# Patient Record
Sex: FEMALE | Race: AMERICAN INDIAN OR ALASKA NATIVE | NOT HISPANIC OR LATINO | Employment: OTHER | URBAN - METROPOLITAN AREA
[De-identification: names, ages, dates, MRNs, and addresses within clinical notes are randomized per-mention and may not be internally consistent; named-entity substitution may affect disease eponyms.]

---

## 2019-01-11 ENCOUNTER — HISTORICAL (OUTPATIENT)
Dept: RADIOLOGY | Facility: HOSPITAL | Age: 54
End: 2019-01-11

## 2019-01-25 ENCOUNTER — HISTORICAL (OUTPATIENT)
Dept: ADMINISTRATIVE | Facility: HOSPITAL | Age: 54
End: 2019-01-25

## 2022-04-05 ENCOUNTER — HISTORICAL (OUTPATIENT)
Dept: RADIOLOGY | Facility: HOSPITAL | Age: 57
End: 2022-04-05

## 2022-04-05 ENCOUNTER — HISTORICAL (OUTPATIENT)
Dept: ADMINISTRATIVE | Facility: HOSPITAL | Age: 57
End: 2022-04-05

## 2022-04-09 ENCOUNTER — HISTORICAL (OUTPATIENT)
Dept: ADMINISTRATIVE | Facility: HOSPITAL | Age: 57
End: 2022-04-09
Payer: MEDICAID

## 2022-04-27 VITALS
WEIGHT: 162.94 LBS | DIASTOLIC BLOOD PRESSURE: 78 MMHG | HEIGHT: 65 IN | BODY MASS INDEX: 27.15 KG/M2 | SYSTOLIC BLOOD PRESSURE: 113 MMHG

## 2022-04-30 NOTE — H&P
Patient:   Adrianna Velazquez            MRN: [id number]            FIN: [id number]               Age:   53 years     Sex:  Female     :  1965   Associated Diagnoses:   None   Author:   Shelley MEREDITH, Liza COLE      History of Present Illness   Ms. Velazquez is a 53 year old AAF with history of BRCA here for an EUS for duodenal submucosal mass.  She had imaging performed for BRCA surveillance that showed a 1.4 x 3.3 solid mass in the duodenum previously seen in 2016 but with interval enlargement.  She had an EGD with Dr. Knapp that described gastritis, gastric ulcers, 2.5 cm mass in the duodenum with nondiagnostic mucosal biopsies.  she did have a PET CT that did not show significant uptake in this mass.  She was seen by Dr. Ca who then referred her to me.   She reports problems with constipation.  Usually has a BM every 3-4 days.  Has tried fiber with limited success.  No bleeding or melena.  No significant abdominal pain but does have some bloating.  no weight loss.  No nausea, vomiting, early satiety, or reflux.  No anticoagulants.        Review of Systems   All other systems are negative      Health Status   Allergies:    Allergies (1) Active Reaction  Lortab UNKNOWN   Current medications:  (Selected)   Inpatient Medications  Ordered  Buffered Lidocaine 1% - 1mL syringe: 0.5 mL, 5 mg =, form: Injection, ID, As Directed PRN for other (see comment), first dose 19 8:40:00 CST, May inject 0.5mL at IV site, if not allergic  Plasmalyte 1,000 mL: 1,000 mL, 1,000 mL, IV, 20 mL/hr, start date 19 8:40:00 CST  midazolam: 1 mg, form: Injection, IV Push, q5min PRN for anxiety, Order duration: 2 dose(s), first dose 19 8:40:00 CST, stop date Limited # of times, may repeat x1 in 5 minutes if anxiety not relieved.  Hold  if pregnancy test is pending.  (ADULT PATIENTS)...  Documented Medications  Documented  HYDROCODONE-ACETAMIN 7.5-325: 1 tab(s), Oral, q4hr  NAPROXEN 500 MG TABLET: 500 mg = 1  tab(s), Oral, BID  PANTOPRAZOLE 40MG TABLETS: 40 mg = 1 tab(s), Oral, Daily  RANITIDINE 300 MG TABLET:   ZOLPIDEM 10MG TABLETS: 10 mg = 1 tab(s), Oral, qPM      Histories   Procedure history:    Endoscopic Ultrasonography (Upper) on 1/25/2019 at 53 Years.  Comments:  1/25/2019 10:Renea Vincent RN  auto-populated from documented surgical case  US FNA First Lesion (Upper) on 1/25/2019 at 53 Years.  Comments:  1/25/2019 10:Renea Vincent RN  auto-populated from documented surgical case  Esophagogastroduodenoscopy (Upper) on 1/25/2019 at 53 Years.  Comments:  1/25/2019 10:Renea Vincent RN  auto-populated from documented surgical case  Mastectomy Right.  Hernia Repair.   Social History        Social & Psychosocial Habits    Alcohol  01/24/2019  Use: Current    Tobacco  08/16/2018  Use: Former smoker    Type: Cigarettes    01/24/2019  Use: Former smoker, quit more    Patient Wants Consult For Cessation Counseling N/A    01/25/2019  Use: Former smoker, quit more    Patient Wants Consult For Cessation Counseling N/A.        Physical Examination      Vital Signs (last 24 hrs)_____  Last Charted___________  Temp Oral     36.6 DegC  (JAN 25 07:38)  Heart Rate Peripheral   89 bpm  (JAN 25 07:38)  Resp Rate         18 br/min  (JAN 25 07:38)  SBP      124 mmHg  (JAN 25 07:38)  DBP      84 mmHg  (JAN 25 07:38)  SpO2      99 %  (JAN 25 07:38)   General:  Alert and oriented, No acute distress.         Appearance: Well nourished.    Eye:  Normal conjunctiva.    Respiratory:  Lungs are clear to auscultation, Respirations are non-labored, Breath sounds are equal.    Cardiovascular:  Normal rate, Regular rhythm, No murmur, No edema.    Gastrointestinal:  Soft, Non-tender, Non-distended, Normal bowel sounds.    Integumentary:  Warm, Pink, No pallor, No rash.    Neurologic:  Alert, Oriented, No focal deficits.    Psychiatric:  Cooperative, Appropriate mood & affect, Normal judgment.        Impression and Plan   Ms. Velazquez is a 53 year old AAF with history of BRCA here for an EUS for duodenal submucosal mass.        Risks, benefits, and alternatives of the procedure discussed.   Will proceed with endoscopic procedure as scheduled.

## 2022-08-22 ENCOUNTER — TELEPHONE (OUTPATIENT)
Dept: HEMATOLOGY/ONCOLOGY | Facility: CLINIC | Age: 57
End: 2022-08-22

## 2022-08-22 NOTE — TELEPHONE ENCOUNTER
----- Message from Sunshine Briceño sent at 8/22/2022  8:46 AM CDT -----  Pt called today to rs appt of 8/22/22 due to new start of job, r/s to 11/15/2022.  She is doing good, she will call us if needs sooner.  Thanks  Sunshine

## 2022-11-15 ENCOUNTER — OFFICE VISIT (OUTPATIENT)
Dept: HEMATOLOGY/ONCOLOGY | Facility: CLINIC | Age: 57
End: 2022-11-15
Payer: MEDICAID

## 2022-11-15 VITALS
SYSTOLIC BLOOD PRESSURE: 112 MMHG | WEIGHT: 179 LBS | OXYGEN SATURATION: 99 % | DIASTOLIC BLOOD PRESSURE: 84 MMHG | TEMPERATURE: 98 F | BODY MASS INDEX: 29.82 KG/M2 | HEART RATE: 65 BPM | RESPIRATION RATE: 18 BRPM | HEIGHT: 65 IN

## 2022-11-15 DIAGNOSIS — Z85.3 PERSONAL HISTORY OF MALIGNANT NEOPLASM OF BREAST: Primary | ICD-10-CM

## 2022-11-15 DIAGNOSIS — Z17.1 MALIGNANT NEOPLASM OF RIGHT BREAST IN FEMALE, ESTROGEN RECEPTOR NEGATIVE, UNSPECIFIED SITE OF BREAST: Primary | ICD-10-CM

## 2022-11-15 DIAGNOSIS — C50.911 MALIGNANT NEOPLASM OF RIGHT BREAST IN FEMALE, ESTROGEN RECEPTOR NEGATIVE, UNSPECIFIED SITE OF BREAST: Primary | ICD-10-CM

## 2022-11-15 PROCEDURE — 3074F SYST BP LT 130 MM HG: CPT | Mod: CPTII,,, | Performed by: STUDENT IN AN ORGANIZED HEALTH CARE EDUCATION/TRAINING PROGRAM

## 2022-11-15 PROCEDURE — 99214 OFFICE O/P EST MOD 30 MIN: CPT | Mod: ,,, | Performed by: STUDENT IN AN ORGANIZED HEALTH CARE EDUCATION/TRAINING PROGRAM

## 2022-11-15 PROCEDURE — 3079F DIAST BP 80-89 MM HG: CPT | Mod: CPTII,,, | Performed by: STUDENT IN AN ORGANIZED HEALTH CARE EDUCATION/TRAINING PROGRAM

## 2022-11-15 PROCEDURE — 99214 PR OFFICE/OUTPT VISIT, EST, LEVL IV, 30-39 MIN: ICD-10-PCS | Mod: ,,, | Performed by: STUDENT IN AN ORGANIZED HEALTH CARE EDUCATION/TRAINING PROGRAM

## 2022-11-15 PROCEDURE — 3079F PR MOST RECENT DIASTOLIC BLOOD PRESSURE 80-89 MM HG: ICD-10-PCS | Mod: CPTII,,, | Performed by: STUDENT IN AN ORGANIZED HEALTH CARE EDUCATION/TRAINING PROGRAM

## 2022-11-15 PROCEDURE — 1159F MED LIST DOCD IN RCRD: CPT | Mod: CPTII,,, | Performed by: STUDENT IN AN ORGANIZED HEALTH CARE EDUCATION/TRAINING PROGRAM

## 2022-11-15 PROCEDURE — 3008F PR BODY MASS INDEX (BMI) DOCUMENTED: ICD-10-PCS | Mod: CPTII,,, | Performed by: STUDENT IN AN ORGANIZED HEALTH CARE EDUCATION/TRAINING PROGRAM

## 2022-11-15 PROCEDURE — 3008F BODY MASS INDEX DOCD: CPT | Mod: CPTII,,, | Performed by: STUDENT IN AN ORGANIZED HEALTH CARE EDUCATION/TRAINING PROGRAM

## 2022-11-15 PROCEDURE — 1159F PR MEDICATION LIST DOCUMENTED IN MEDICAL RECORD: ICD-10-PCS | Mod: CPTII,,, | Performed by: STUDENT IN AN ORGANIZED HEALTH CARE EDUCATION/TRAINING PROGRAM

## 2022-11-15 PROCEDURE — 3074F PR MOST RECENT SYSTOLIC BLOOD PRESSURE < 130 MM HG: ICD-10-PCS | Mod: CPTII,,, | Performed by: STUDENT IN AN ORGANIZED HEALTH CARE EDUCATION/TRAINING PROGRAM

## 2022-11-15 RX ORDER — ZOLPIDEM TARTRATE 10 MG/1
10 TABLET ORAL NIGHTLY
COMMUNITY
Start: 2022-11-07

## 2022-11-15 RX ORDER — PROPRANOLOL HYDROCHLORIDE 10 MG/1
TABLET ORAL
COMMUNITY
Start: 2022-10-09

## 2022-11-15 RX ORDER — AMPICILLIN TRIHYDRATE 500 MG
1000 CAPSULE ORAL DAILY
COMMUNITY
Start: 2022-11-04

## 2022-11-15 RX ORDER — LINACLOTIDE 290 UG/1
290 CAPSULE, GELATIN COATED ORAL 3 TIMES DAILY
COMMUNITY
Start: 2022-10-05

## 2022-11-15 NOTE — PROGRESS NOTES
Chief complaint: hx of breast cancer  Previous oncologist at [facility]: Dr. Jimenez     HPI: 56 y/o F w/ PMHx of breast cancer in 2007 referred to [facility]    At age 41 felt a lump in her breast. Had MMG that was suspicious. Lumpectomy showed G3 IDC in right breast with micromet in 1/5 SL. Axillary contents were all negative 2/8/7. Margins were positive for DCIS. She underwent simple mastectomy 2/19/17, no residual IDC, margins free from DCIS. Tumor was triple negative. It appears she received ddAC x4 and then dd Taxol    2/13/19 she had resection of duodenum with cholecystectomy and omental flap closure that showed a leiomyoma with Dr Ca    11/2019 genetic testing negative for VUS or any deleterious mutations    10/2020 colonoscopy with Dr Knapp.    Today, 11/15/2022, patient denies any new concerns.  She reports persistent low mood and fatigue.  She denies any new symptoms of pain, decreased appetite, weight loss, new lumps or bumps.  She underwent a screening left mammogram in September 2022, a diagnostic right ultrasound and mammogram in April 2022.  She is scheduled to be seen by Dr. Mast in the near future for revision.  She had diagnosis of rapid heartbeat for which she was seen by Cardiology and started on new medication since her last follow-up visit with us.    PMHx: breast cancer  PSHx: right MRM and ALND, duodenum resection, cholecystectomy, omental flap closure, umbilical hernia, partial hysterectomy, tubal ligation  Social Hx: former smoker, quit in 2019, social ETOH, no drugs  Family Hx: mother: breast cancer, father: melanoma, maternal aunts x2: breast cancer, maternal cousin: breast cancer  Meds: reviewed  Allergies: NKDA    Labs:  3/17/20 Cr 0.79 Alb 3.8 B12 1154 HIV neg WBC 5.62 Hg 12.9  RPR neg  12/13/2021: CBC and CMP unremarkable. FSH and LH is in the postmenopausal range. Vitamin B12, folic acid level, TSH within normal limits.  11/14/2022:  WBC count 6.5, hemoglobin 11.8, MCV  80, platelet count 258, ANC 3.6, CMP pending.    Imagin2022 right diagnostic digital mammogram:  BI-RADS 2    2022:  Ultrasound right breast complete:  Postsurgical changes related to prior mastectomy, BI-RADS 2 benign.    20 MRA neck w/ contrast: no stenosis, no findings  20 MRI brain w/o contrast: unremarkable    18 PET CT: known mass arising from 3rd portion of duodenum is not appreciably hypermetabolic. Possibly GIST, tissue sampling suggested    10/23/18 MMG diagnostic Left: BIRADS2    10/8/18 Ct chest w/ contrast: 4mm zone of nodularity abuts the pleural surface of RLL superiorly most typical of interval mild peripheral scar or atelectasis  10/8/18 CT A/P w/ IV contrast: 3.3x1.4cm proximal transverse duodenum solid mass    7/14/15 bone scan: low grade arthritic activity. No bone mets    7/14/15 CT A/P w/ contrast: 2.2x1.4cm low density mass within transverse duodenum. Equivocal smaller intraluminal nodule proximal jejunum  7/14/15 CT chest w/ contrast: millimetric nodules are present most focally within posterior basal left upper lobe.    2021: mammogram BI-RADS 1 negative    Path:  19 FNA duodenum submucosal nodule: benign smooth muscle likely leiomyoma     Right breast simple mastectomy: multifocal DCIS comedo, GIII. No residual IDC. Deep margin is free of tumor, closest is deep at 0.3cm     right breast mass lumpectomy: IDC G3, 1.5cm. Associated DCIS, solid type GIII. Skin free of tumor, margins are negative but closest is superficial at 5mm. DCIS involves inked surgical margin at multiple foci (inferior, deep, medial and lateral). 1/ SLN positive for metastatic carcinoma, micromet 1.8mm, no extracapsular extension. 13 axillary nodes all negative  pT1c pN1mi  ER 0% NE 0% HER2 1+       Review of Systems     CONSTITUTIONAL: no fevers, no chills, no weight loss, + fatigue, no weakness  HEMATOLOGIC: no abnromal bleeding, no abnormal bruising, no drenching  night sweats  ONCOLOGIC: no new masses or lumps  HEENT: no vision loss, no tinnitus or hearing loss, no nose bleeding, no dysphagia, no odynophagia  CVS: no chest pain, no palpitations, no dyspnea on exertion  RESP: no shortness of breath, no hemoptysis, no cough  BREAST: no nipple discharge, no breast tenderness, no breast masses on self breast examination  GI: no nausea, no vomiting, no diarrhea, no constipation, no melena, no hematochezia, no hematemesis, no abdominal pain, no increase in abdominal girth  : no dysuria, no hematuria, no discharge  GYN: no abnormal vaginal bleeding, no dyspareunia, no vaginal discharge  INTEGUMENT: no rashes, no abnormal bruising, no nail pitting, no hyperpigmentation  NEURO: no falls, no memory loss, no paresthesias or dysesthesias, no urofecal incontinence or retention, no loss of strength on any extremity  MSK: no back pain, no new joint pain, no joint swelling  PSYCH: no suicidal or homicidal ideation, no depression, no insomnia, no anhedonia  ENDOCRINE: no heat or cold intolerance, no polyuria, no polydipsia      Physical Exam     Vitals:    11/15/22 1413   BP: 112/84   Pulse: 65   Resp: 18   Temp: 98.2 °F (36.8 °C)       GA: AAOx3, NAD  HEENT: NCAT, PERRLA, EOMI, good dentition, no oral ulcers  LYMPH: no cervical, axillary or supraclavicular adenopathy  CVS: s1s2 RRR, no M/R/G  RESP: CTA b/l, no crackles, no wheezes or rhonchi  Breast: Left: no masses, lumps, no nipple inversion, no peau d'orange, Right: no masses, lumps, no nipple inversion, no peau d'orange, saline implant present  ABD: soft, NT, ND, BS+, no hepatosplenomegaly  EXT: no deformities, no pedal edema  SKIN: no rashes, no bruises or purpura, warm and dry  NEURO: normal mentation, strength 5/5 on all 4 extremities, no sensory deficits       Assessment/Plan       # History of right breast cancer Z85.3 History of right breast cancer    IDC G3 pT1c pN1mi, stage IIA, ER 0% IN 0% HER2 1+ diagnosed 2/2007 s/p  lumpectomy and ALND followed by simple mastectomy due to margins being positive for DCIS  Completed adjuvant ddACx4 and ddTx4 though we do not have any records. Has been INNA since then   mammogram from 9/8/2021 with BI-RADS 1 negative  Screening left mammogram in September 2022 BI-RADS 2   Diagnostic right mammogram and ultrasound in April 2022 BI-RADS 2    Plan  We will re refer patient to psychiatry to help her with symptoms of low mood and fatigue. Patient has tried bupropion in the past with intolerance to it.  Follow-up pending CMP done today at [facility]  We will plan to follow-up with patient in 6 months with repeat labs.  Patient continue to have annual mammograms, next to be scheduled in September 2023.  Patient is scheduled to be seen by Dr. Mast for breast reconstruction revision in the near future.      A total of  30 minutes were spent in review of records, interpretation of test, coordination of care, discussion and counseling with the patient.        Portions of the record may have been created with voice recognition software. Occasional wrong-word or sound-a-like substitutions may have occurred due to the inherent limitations of voice recognition software. Read the chart carefully and recognize, using context, where substitutions have occurred.

## 2023-04-05 ENCOUNTER — OFFICE VISIT (OUTPATIENT)
Dept: SURGERY | Facility: CLINIC | Age: 58
End: 2023-04-05
Payer: MEDICAID

## 2023-04-05 VITALS
SYSTOLIC BLOOD PRESSURE: 125 MMHG | HEIGHT: 65 IN | OXYGEN SATURATION: 98 % | RESPIRATION RATE: 18 BRPM | BODY MASS INDEX: 30.99 KG/M2 | DIASTOLIC BLOOD PRESSURE: 80 MMHG | TEMPERATURE: 99 F | HEART RATE: 80 BPM | WEIGHT: 186 LBS

## 2023-04-05 DIAGNOSIS — Z85.3 PERSONAL HISTORY OF BREAST CANCER: Primary | ICD-10-CM

## 2023-04-05 DIAGNOSIS — Z12.31 SCREENING MAMMOGRAM, ENCOUNTER FOR: ICD-10-CM

## 2023-04-05 PROCEDURE — 3079F DIAST BP 80-89 MM HG: CPT | Mod: CPTII,,, | Performed by: PHYSICIAN ASSISTANT

## 2023-04-05 PROCEDURE — 1159F PR MEDICATION LIST DOCUMENTED IN MEDICAL RECORD: ICD-10-PCS | Mod: CPTII,,, | Performed by: PHYSICIAN ASSISTANT

## 2023-04-05 PROCEDURE — 99999 PR PBB SHADOW E&M-EST. PATIENT-LVL IV: ICD-10-PCS | Mod: PBBFAC,,, | Performed by: PHYSICIAN ASSISTANT

## 2023-04-05 PROCEDURE — 3079F PR MOST RECENT DIASTOLIC BLOOD PRESSURE 80-89 MM HG: ICD-10-PCS | Mod: CPTII,,, | Performed by: PHYSICIAN ASSISTANT

## 2023-04-05 PROCEDURE — 99214 OFFICE O/P EST MOD 30 MIN: CPT | Mod: S$PBB,,, | Performed by: PHYSICIAN ASSISTANT

## 2023-04-05 PROCEDURE — 1159F MED LIST DOCD IN RCRD: CPT | Mod: CPTII,,, | Performed by: PHYSICIAN ASSISTANT

## 2023-04-05 PROCEDURE — 99999 PR PBB SHADOW E&M-EST. PATIENT-LVL IV: CPT | Mod: PBBFAC,,, | Performed by: PHYSICIAN ASSISTANT

## 2023-04-05 PROCEDURE — 99214 OFFICE O/P EST MOD 30 MIN: CPT | Mod: PBBFAC | Performed by: PHYSICIAN ASSISTANT

## 2023-04-05 PROCEDURE — 1160F PR REVIEW ALL MEDS BY PRESCRIBER/CLIN PHARMACIST DOCUMENTED: ICD-10-PCS | Mod: CPTII,,, | Performed by: PHYSICIAN ASSISTANT

## 2023-04-05 PROCEDURE — 3074F PR MOST RECENT SYSTOLIC BLOOD PRESSURE < 130 MM HG: ICD-10-PCS | Mod: CPTII,,, | Performed by: PHYSICIAN ASSISTANT

## 2023-04-05 PROCEDURE — 3074F SYST BP LT 130 MM HG: CPT | Mod: CPTII,,, | Performed by: PHYSICIAN ASSISTANT

## 2023-04-05 PROCEDURE — 99214 PR OFFICE/OUTPT VISIT, EST, LEVL IV, 30-39 MIN: ICD-10-PCS | Mod: S$PBB,,, | Performed by: PHYSICIAN ASSISTANT

## 2023-04-05 PROCEDURE — 3008F PR BODY MASS INDEX (BMI) DOCUMENTED: ICD-10-PCS | Mod: CPTII,,, | Performed by: PHYSICIAN ASSISTANT

## 2023-04-05 PROCEDURE — 1160F RVW MEDS BY RX/DR IN RCRD: CPT | Mod: CPTII,,, | Performed by: PHYSICIAN ASSISTANT

## 2023-04-05 PROCEDURE — 3008F BODY MASS INDEX DOCD: CPT | Mod: CPTII,,, | Performed by: PHYSICIAN ASSISTANT

## 2023-04-05 RX ORDER — IBUPROFEN 800 MG/1
800 TABLET ORAL
COMMUNITY
Start: 2023-02-02

## 2023-04-05 RX ORDER — LORATADINE 10 MG/1
TABLET ORAL
COMMUNITY

## 2023-04-05 NOTE — PROGRESS NOTES
[facility] Breast Surg  Breast Surgical Oncology  Follow-Up Patient Office Visit       Referring Provider: No ref. provider found   PCP: Primary Doctor No   Care Team:   Medical Oncologist: Dr. Cr Rios  Previous oncologist at [facility]: Dr. Jimenez     Chief Complaint:   Chief Complaint   Patient presents with    Follow-up     LOQ pain in right breast from implant, was seen by Dr Mast but prefers to see another physician due to indifference in implant/surgical treatment      Subjective:   Treatment History:  At age 41 felt a lump in her breast. Had MMG that was suspicious. Lumpectomy showed G3 IDC in right breast with micromet in 1/5 SL. Axillary contents were all negative 2/8/7. Margins were positive for DCIS. She underwent simple mastectomy 2/19/17, no residual IDC, margins free from DCIS. Tumor was triple negative. It appears she received ddAC x4 and then dd Taxol    2/13/19 she had resection of duodenum with cholecystectomy and omental flap closure that showed a leiomyoma with Dr Ca    11/2019 genetic testing negative for VUS or any deleterious mutations    Interval History:  4/5/2023 - Adrianna Velazquez is here today for annual follow up for history of breast cancer. She states that she is doing well other than occasional pains in the right reconstructed breast/axilla which have occurred off and on since her initial surgery. It is associated with chronic right shoulder stiffness since time of surgery as well. She also has decided to wait on revising her reconstruction because she wants to try with another surgeon eventually. She has had screening mammogram on the left in September which was benign. She continues follow up with oncology. She states that she feels much better mentally/emotionally lately regarding her breast cancer and finds that talking with a close friend who is also a therapist helps her.    HPI:  Adrianna Velazquez is a pleasant Female patient who  initially presents on 22 at 56 Years old with a past medical history of right breast cancer diagnosed at age 41. She presents to establish care for breast cancer surveillance.  She is also following with plastic surgeon, Dr. Mast, with concerns about right implant integrity.     She states that her treatments in  were performed in [place] in [place] but she is not sure which hospital or which physician treated her. I do not have records of her pathology, imaging, or office visit notes. All information noted under treatments and pathology are as seen in [facility] Medical Oncology notes.    Imagin.  2021 L SCR MG at [facility] - BIRADS 1: NEGATIVE: No evidence of malignancy.     2.  2022 R DG MG and R breast US at [facility] to evaluate for right breast implant integrity - BIRADS 2: ULTRASOUND BENIGN 1) Postsurgical changes related to prior right mastectomy (including removal of the right nipple) and subpectoral silicone implant reconstruction. BI-RADS 2: Benign. 2) No mammographic or sonographic evidence of right breast implant rupture, noting implant integrity cannot be definitively assessed with mammography and sonography. For definitive implant assessment, a breast MRI utilizing the implant integrity protocol is necessary. This exam does not require intravenous gadolinium contrast.  3) No mammographic or sonographic evidence of malignancy in the right breast.    3.  9/15/2022 L SCR MG at [facility] - BIRADS 2: BENIGN: No new suspicious masses, microcalcifications, or other abnormalities are   seen within the left breast.    Pathology:   right breast mass lumpectomy: IDC G3, 1.5cm. Associated DCIS, solid type GIII. Skin free of tumor, margins are negative but closest is superficial at 5mm. DCIS involves inked surgical margin at multiple foci (inferior, deep, medial and lateral). / SLN positive for metastatic carcinoma, micromet 1.8mm, no extracapsular extension. 13 axillary nodes all negative  pT1c  pN1mi  ER 0% ME 0% HER2 1+     2/ Right breast simple mastectomy: multifocal DCIS comedo, GIII. No residual IDC. Deep margin is free of tumor, closest is deep at 0.3cm    OB/GYN History:  Menarche Onset: 13  Menopause: Post, at age: 46  Hormonal birth control (duration): 22years  Pregnancies: 3  Age at first child birth: 19  Child births: 3  Hysterectomy: yes,  46  Oophorectomy: no  HRT: no    Other:  MG breast density: BIRADS B, scattered fibroglandular densities  Prior thoracic RT: none  Genetic testing: yes, negative  Ashkenazi [employer]ism descent: No    Family History:  Family History   Problem Relation Age of Onset    Breast cancer Mother 62    Breast cancer Maternal Aunt         dx in their 40s    Breast cancer Maternal Aunt         dx in their 40s    Breast cancer Maternal Aunt         dx in their 40s        Patient History:  Past Medical History:   Diagnosis Date    Depression        Past Surgical History:   Procedure Laterality Date    HYSTERECTOMY         Social History     Socioeconomic History    Marital status:    Tobacco Use    Smoking status: Former     Types: Cigarettes     Quit date: 2019     Years since quittin.2    Smokeless tobacco: Never         There is no immunization history on file for this patient.    Medications/Allergies:  Current Outpatient Medications on File Prior to Visit   Medication Sig Dispense Refill    ibuprofen (ADVIL,MOTRIN) 800 MG tablet Take 800 mg by mouth. prn      LINZESS 290 mcg Cap capsule Take 290 mcg by mouth 3 (three) times daily.      loratadine (CLARITIN) 10 mg tablet prn      propranoloL (INDERAL) 10 MG tablet Take by mouth.      VITAMIN D3 25 mcg (1,000 unit) capsule Take 1,000 Units by mouth once daily.      zolpidem (AMBIEN) 10 mg Tab Take 10 mg by mouth every evening.       No current facility-administered medications on file prior to visit.       Review of patient's allergies indicates:  No Known Allergies    Review of Systems:  Pertinent items  are noted in HPI.     Objective:     Vitals:  Vitals:    04/05/23 1008   BP: 125/80   Pulse: 80   Resp: 18   Temp: 98.8 °F (37.1 °C)       Body mass index is 30.95 kg/m².     Physical Exam:  General: The patient is awake, alert and oriented times three. The patient is well nourished and in no acute distress.  Neck: There is no evidence of palpable cervical, supraclavicular or axillary adenopathy. The neck is supple. The thyroid is not enlarged.  Musculoskeletal: The patient has a normal range of motion of her bilateral upper extremities.  Chest: Examination of the chest wall fails to reveal any obvious abnormalities. Nonlabored breathing, symmetric expansion.  Breast:  Right: The right reconstructed slightly smaller than the left breast.  Examination fails to reveal any dominant masses or areas of significant focal nodularity. The nipple is surgically absent. There is no skin dimpling with movement of the pectoralis. There are no significant skin changes.   Left: Examination of the left breast fails to reveal any dominant masses or areas of significant focal nodularity. The nipple is everted without evidence of discharge. There is no skin dimpling with movement of the pectoralis. There are no significant skin changes overlying the breast.  Abdomen: The abdomen is soft, flat, nontender and nondistended.  Integumentary: no rashes or skin lesions present  Neurologic: cranial nerves intact, no signs of peripheral neurological deficit, motor/sensory function intact      Assessment and Plan:       Adrianna was seen today for follow-up.    Diagnoses and all orders for this visit:    Personal history of breast cancer  -     MRI Breast w/wo Contrast, w/CAD, Bilateral; Future    Screening mammogram, encounter for  -     Mammo Digital Screening Left with [place]; Future       4/5/2023 - Adrianna Velazquez is here today for annual follow up for history of breast cancer. She states that she is doing well other than occasional pains in  the right reconstructed breast/axilla which have occurred off and on since her initial surgery. It is associated with chronic right shoulder stiffness since time of surgery as well. She also has decided to wait on revising her reconstruction because she wants to try with another surgeon eventually. She has had screening mammogram on the left in September which was benign. She continues follow up with oncology. She states that she feels much better mentally/emotionally lately regarding her breast cancer and finds that talking with a close friend who is also a therapist helps her.     Plan:     1.  Recommend surveillance with screening breast MRIs in this patient with a history of breast cancer diagnosed before 50 years of age. This can be performed at the next available appointment. She will be due for a left screening mammogram in September 2023. She would like to have these done at [facility].    2.  She will RTC in 1 year for annual follow up. At this follow up, she will have had her breast MRI and SCR MG.    3.  Okay to proceed with plastic surgery/implant exchange if desired.  She wants to go to see a plastic surgeon [place] or [facility] for second opinion, but not at this time.    4. Healthy lifestyle guidelines were reviewed. She was encouraged to engage in regular exercise, maintain a healthy body weight, and avoid excessive alcohol consumption. Healthy nutritional guidelines were also discussed. Self-breast examination was reviewed with the patient in detail and she was encouraged to perform this on a monthly basis.    5. Provided exercises for stretching and strengthening for the right shoulder and to help with pain near the axilla.      All of her questions were answered. She was advised to call if she develops any questions or concerns.    Bessy De La Rosa PA-C          Total time on the date of the visit ranged from 30-39 mins ([id number]). Total time includes both face-to-face and non-face-to-face time  personally spent by myself on the day of the visit.    Non-face-to-face time included:  _X_ preparing to see the patient such as reviewing the patient record  __ obtaining and reviewing separately obtained history  _X_ independently interpreting results  _X_ documenting clinical information in electronic health record.    Face-to-face time included:  _X_ performing an appropriate history and examination  _X_ communicating results to the patient  _X_ counseling and educating the patient  __ ordering appropriate medications  _x_ ordering appropriate tests  _X_ ordering appropriate procedures (including follow-up)  _X_ answering any questions the patient had    Total Time spent on date of visit: 35 minutes

## 2023-06-29 ENCOUNTER — OFFICE VISIT (OUTPATIENT)
Dept: HEMATOLOGY/ONCOLOGY | Facility: CLINIC | Age: 58
End: 2023-06-29
Payer: MEDICAID

## 2023-06-29 VITALS
SYSTOLIC BLOOD PRESSURE: 104 MMHG | BODY MASS INDEX: 30.44 KG/M2 | RESPIRATION RATE: 18 BRPM | HEART RATE: 87 BPM | WEIGHT: 182.69 LBS | TEMPERATURE: 98 F | HEIGHT: 65 IN | OXYGEN SATURATION: 98 % | DIASTOLIC BLOOD PRESSURE: 72 MMHG

## 2023-06-29 DIAGNOSIS — Z17.1 MALIGNANT NEOPLASM OF RIGHT BREAST IN FEMALE, ESTROGEN RECEPTOR NEGATIVE, UNSPECIFIED SITE OF BREAST: Primary | ICD-10-CM

## 2023-06-29 DIAGNOSIS — C50.911 MALIGNANT NEOPLASM OF RIGHT BREAST IN FEMALE, ESTROGEN RECEPTOR NEGATIVE, UNSPECIFIED SITE OF BREAST: Primary | ICD-10-CM

## 2023-06-29 PROCEDURE — 99214 PR OFFICE/OUTPT VISIT, EST, LEVL IV, 30-39 MIN: ICD-10-PCS | Mod: ,,, | Performed by: STUDENT IN AN ORGANIZED HEALTH CARE EDUCATION/TRAINING PROGRAM

## 2023-06-29 PROCEDURE — 3008F PR BODY MASS INDEX (BMI) DOCUMENTED: ICD-10-PCS | Mod: CPTII,,, | Performed by: STUDENT IN AN ORGANIZED HEALTH CARE EDUCATION/TRAINING PROGRAM

## 2023-06-29 PROCEDURE — 3074F SYST BP LT 130 MM HG: CPT | Mod: CPTII,,, | Performed by: STUDENT IN AN ORGANIZED HEALTH CARE EDUCATION/TRAINING PROGRAM

## 2023-06-29 PROCEDURE — 3074F PR MOST RECENT SYSTOLIC BLOOD PRESSURE < 130 MM HG: ICD-10-PCS | Mod: CPTII,,, | Performed by: STUDENT IN AN ORGANIZED HEALTH CARE EDUCATION/TRAINING PROGRAM

## 2023-06-29 PROCEDURE — 1159F PR MEDICATION LIST DOCUMENTED IN MEDICAL RECORD: ICD-10-PCS | Mod: CPTII,,, | Performed by: STUDENT IN AN ORGANIZED HEALTH CARE EDUCATION/TRAINING PROGRAM

## 2023-06-29 PROCEDURE — 3078F DIAST BP <80 MM HG: CPT | Mod: CPTII,,, | Performed by: STUDENT IN AN ORGANIZED HEALTH CARE EDUCATION/TRAINING PROGRAM

## 2023-06-29 PROCEDURE — 3078F PR MOST RECENT DIASTOLIC BLOOD PRESSURE < 80 MM HG: ICD-10-PCS | Mod: CPTII,,, | Performed by: STUDENT IN AN ORGANIZED HEALTH CARE EDUCATION/TRAINING PROGRAM

## 2023-06-29 PROCEDURE — 3008F BODY MASS INDEX DOCD: CPT | Mod: CPTII,,, | Performed by: STUDENT IN AN ORGANIZED HEALTH CARE EDUCATION/TRAINING PROGRAM

## 2023-06-29 PROCEDURE — 99214 OFFICE O/P EST MOD 30 MIN: CPT | Mod: ,,, | Performed by: STUDENT IN AN ORGANIZED HEALTH CARE EDUCATION/TRAINING PROGRAM

## 2023-06-29 PROCEDURE — 1159F MED LIST DOCD IN RCRD: CPT | Mod: CPTII,,, | Performed by: STUDENT IN AN ORGANIZED HEALTH CARE EDUCATION/TRAINING PROGRAM

## 2023-06-29 RX ORDER — OMEPRAZOLE 40 MG/1
40 CAPSULE, DELAYED RELEASE ORAL DAILY
COMMUNITY

## 2023-06-29 NOTE — PROGRESS NOTES
Chief complaint: hx of breast cancer  Previous oncologist at [facility]: Dr. Jimenez     HPI:    54 y/o F w/ PMHx of breast cancer in 2007 referred to [facility]  At age 41 felt a lump in her breast. Had MMG that was suspicious. Lumpectomy showed G3 IDC in right breast with micromet in 1/5 SL. Axillary contents were all negative . Margins were positive for DCIS. She underwent simple mastectomy 17, no residual IDC, margins free from DCIS. Tumor was triple negative. It appears she received ddAC x4 and then dd Taxol    19 she had resection of duodenum with cholecystectomy and omental flap closure that showed a leiomyoma with Dr Ca    2019 genetic testing negative for VUS or any deleterious mutations    10/2020 colonoscopy with Dr Knapp.    Interval history  2023, patient continues to report symptoms of fatigue as well as low mood intermittently.  She denies any new lumps or bumps, decreased appetite.  She reports 2 lb intentional weight loss.  She denies any new medications, ER or hospital visits.      Labs:  3/17/20 Cr 0.79 Alb 3.8 B12 1154 HIV neg WBC 5.62 Hg 12.9  RPR neg  2021: CBC and CMP unremarkable. FSH and LH is in the postmenopausal range. Vitamin B12, folic acid level, TSH within normal limits.  2022:  WBC count 6.5, hemoglobin 11.8, MCV 80, platelet count 258, ANC 3.6, CMP pending.  2023:  WBC count 4.9, hemoglobin 12.2, platelet count 288, ANC 2.7, creatinine 0.97, LFTs within normal limits.  Calcium 10.1.    Imagin/15/2023 left screening mammogram BI-RADS 2 benign finding.    2022 right diagnostic digital mammogram:  BI-RADS 2    2022:  Ultrasound right breast complete:  Postsurgical changes related to prior mastectomy, BI-RADS 2 benign.    20 MRA neck w/ contrast: no stenosis, no findings  20 MRI brain w/o contrast: unremarkable    18 PET CT: known mass arising from 3rd portion of duodenum is not appreciably  hypermetabolic. Possibly GIST, tissue sampling suggested    10/23/18 MMG diagnostic Left: BIRADS2    10/8/18 Ct chest w/ contrast: 4mm zone of nodularity abuts the pleural surface of RLL superiorly most typical of interval mild peripheral scar or atelectasis  10/8/18 CT A/P w/ IV contrast: 3.3x1.4cm proximal transverse duodenum solid mass    7/14/15 bone scan: low grade arthritic activity. No bone mets    7/14/15 CT A/P w/ contrast: 2.2x1.4cm low density mass within transverse duodenum. Equivocal smaller intraluminal nodule proximal jejunum  7/14/15 CT chest w/ contrast: millimetric nodules are present most focally within posterior basal left upper lobe.    9/8/2021: mammogram BI-RADS 1 negative    Path:  1/25/19 FNA duodenum submucosal nodule: benign smooth muscle likely leiomyoma    2/19/7 Right breast simple mastectomy: multifocal DCIS comedo, GIII. No residual IDC. Deep margin is free of tumor, closest is deep at 0.3cm    2/8/7 right breast mass lumpectomy: IDC G3, 1.5cm. Associated DCIS, solid type GIII. Skin free of tumor, margins are negative but closest is superficial at 5mm. DCIS involves inked surgical margin at multiple foci (inferior, deep, medial and lateral). 1/5 SLN positive for metastatic carcinoma, micromet 1.8mm, no extracapsular extension. 13 axillary nodes all negative  pT1c pN1mi  ER 0% ND 0% HER2 1+       Past Medical History:   Diagnosis Date    Depression        Past Surgical History:   Procedure Laterality Date    HYSTERECTOMY      LUMPECTOMY, BREAST Right 2007    MASTECTOMY, MODIFIED RADICAL, WITH SENTINEL LYMPH NODE BIOPSY Right 2007    Resection of duodenum with cholecystectomy and omental flap closure  02/13/2019    Leiomyoma; performed by Dr Ca       Family History   Problem Relation Age of Onset    Breast cancer Mother 62    Breast cancer Maternal Aunt         dx in their 40s    Breast cancer Maternal Aunt         dx in their 40s    Breast cancer Maternal Aunt         dx in  their 40s       Social History     Socioeconomic History    Marital status:    Tobacco Use    Smoking status: Former     Types: Cigarettes     Quit date: 2019     Years since quittin.4    Smokeless tobacco: Never       Current Outpatient Medications   Medication Sig Dispense Refill    ibuprofen (ADVIL,MOTRIN) 800 MG tablet Take 800 mg by mouth. prn      LINZESS 290 mcg Cap capsule Take 290 mcg by mouth 3 (three) times daily.      loratadine (CLARITIN) 10 mg tablet prn      propranoloL (INDERAL) 10 MG tablet Take by mouth.      VITAMIN D3 25 mcg (1,000 unit) capsule Take 1,000 Units by mouth once daily.      zolpidem (AMBIEN) 10 mg Tab Take 10 mg by mouth every evening.       No current facility-administered medications for this visit.       Review of patient's allergies indicates:  No Known Allergies        Review of Systems    CONSTITUTIONAL: no fevers, no chills, no weight loss, + fatigue, no weakness  HEMATOLOGIC: no abnromal bleeding, no abnormal bruising, no drenching night sweats  ONCOLOGIC: no new masses or lumps  HEENT: no vision loss, no tinnitus or hearing loss, no nose bleeding, no dysphagia, no odynophagia  CVS: no chest pain, no palpitations, no dyspnea on exertion  RESP: no shortness of breath, no hemoptysis, no cough  BREAST: no nipple discharge, no breast tenderness, no breast masses on self breast examination  GI: no nausea, no vomiting, no diarrhea, no constipation, no melena, no hematochezia, no hematemesis, no abdominal pain, no increase in abdominal girth  : no dysuria, no hematuria, no discharge  GYN: no abnormal vaginal bleeding, no dyspareunia, no vaginal discharge  INTEGUMENT: no rashes, no abnormal bruising, no nail pitting, no hyperpigmentation  NEURO: no falls, no memory loss, no paresthesias or dysesthesias, no urofecal incontinence or retention, no loss of strength on any extremity  MSK: no back pain, no new joint pain, no joint swelling  PSYCH: no suicidal or homicidal  ideation, no depression, no insomnia, no anhedonia  ENDOCRINE: no heat or cold intolerance, no polyuria, no polydipsia      Physical Exam     There were no vitals filed for this visit.      GA: AAOx3, NAD  HEENT: NCAT, PERRLA, no oral ulcers  LYMPH: no cervical, axillary or supraclavicular adenopathy  CVS: s1s2 RRR, no M/R/G  RESP: CTA b/l, no crackles, no wheezes or rhonchi  ABD: soft, NT, ND, BS+, no hepatosplenomegaly  EXT: no deformities, no pedal edema  SKIN: no rashes, no bruises or purpura, warm and dry  NEURO: normal mentation, strength 5/5 on all 4 extremities, no sensory deficits       Assessment/Plan       # History of right breast cancer Z85.3     IDC G3 pT1c pN1mi, stage IIA, ER 0% MD 0% HER2 1+ diagnosed 2/2007 s/p lumpectomy and ALND followed by simple mastectomy due to margins being positive for DCIS  Completed adjuvant ddACx4 and ddTx4 though we do not have any records. Has been INNA since then  mammogram from 9/8/2021 with BI-RADS 1 negative  Screening left mammogram in September 2022 BI-RADS 2   Diagnostic right mammogram and ultrasound in April 2022 BI-RADS 2  Left screening mammogram in September 2022 BI-RADS 2        Plan  Noted plans for an MRI and mammogram with surgery  Patient is scheduled to be seen by Plastic surgery for breast reconstruction revision in the near future.  Referral to psychiatry for depression and fatigue, patient reports she never got a call for referral was placed 6 months back.  Plan to follow-up in 1 year, mammograms ordered by surgery.        A total of  30 minutes were spent in review of records, interpretation of test, coordination of care, discussion and counseling with the patient.        Portions of the record may have been created with voice recognition software. Occasional wrong-word or sound-a-like substitutions may have occurred due to the inherent limitations of voice recognition software. Read the chart carefully and recognize, using context, where  substitutions have occurred.

## 2024-03-20 ENCOUNTER — HOSPITAL ENCOUNTER (OUTPATIENT)
Dept: RADIOLOGY | Facility: HOSPITAL | Age: 59
Discharge: HOME | End: 2024-03-20
Payer: COMMERCIAL

## 2024-03-20 ENCOUNTER — HOSPITAL ENCOUNTER (OUTPATIENT)
Dept: RADIOLOGY | Facility: HOSPITAL | Age: 59
Discharge: HOME | End: 2024-03-20
Attending: PHYSICIAN ASSISTANT
Payer: COMMERCIAL

## 2024-03-20 DIAGNOSIS — Z12.31 SCREENING MAMMOGRAM FOR BREAST CANCER: ICD-10-CM

## 2024-03-20 DIAGNOSIS — Z12.31 SCREENING MAMMOGRAM, ENCOUNTER FOR: ICD-10-CM

## 2024-03-20 PROCEDURE — 77067 SCR MAMMO BI INCL CAD: CPT | Mod: TC,52

## 2024-03-20 PROCEDURE — 77067 SCR MAMMO BI INCL CAD: CPT | Mod: TC

## 2024-03-20 PROCEDURE — 77067 SCR MAMMO BI INCL CAD: CPT | Mod: 26,52,, | Performed by: STUDENT IN AN ORGANIZED HEALTH CARE EDUCATION/TRAINING PROGRAM

## 2024-03-20 PROCEDURE — 77063 BREAST TOMOSYNTHESIS BI: CPT | Mod: 26,52,, | Performed by: STUDENT IN AN ORGANIZED HEALTH CARE EDUCATION/TRAINING PROGRAM

## 2024-04-16 ENCOUNTER — OFFICE VISIT (OUTPATIENT)
Dept: SURGERY | Facility: CLINIC | Age: 59
End: 2024-04-16
Payer: COMMERCIAL

## 2024-04-16 VITALS
TEMPERATURE: 98 F | OXYGEN SATURATION: 98 % | DIASTOLIC BLOOD PRESSURE: 88 MMHG | WEIGHT: 186.81 LBS | SYSTOLIC BLOOD PRESSURE: 132 MMHG | HEART RATE: 89 BPM | RESPIRATION RATE: 20 BRPM | BODY MASS INDEX: 31.13 KG/M2 | HEIGHT: 65 IN

## 2024-04-16 DIAGNOSIS — Z12.31 SCREENING MAMMOGRAM, ENCOUNTER FOR: ICD-10-CM

## 2024-04-16 DIAGNOSIS — Z85.3 PERSONAL HISTORY OF BREAST CANCER: Primary | ICD-10-CM

## 2024-04-16 PROCEDURE — 3079F DIAST BP 80-89 MM HG: CPT | Mod: CPTII,S$GLB,, | Performed by: PHYSICIAN ASSISTANT

## 2024-04-16 PROCEDURE — 1159F MED LIST DOCD IN RCRD: CPT | Mod: CPTII,S$GLB,, | Performed by: PHYSICIAN ASSISTANT

## 2024-04-16 PROCEDURE — 3008F BODY MASS INDEX DOCD: CPT | Mod: CPTII,S$GLB,, | Performed by: PHYSICIAN ASSISTANT

## 2024-04-16 PROCEDURE — 3075F SYST BP GE 130 - 139MM HG: CPT | Mod: CPTII,S$GLB,, | Performed by: PHYSICIAN ASSISTANT

## 2024-04-16 PROCEDURE — 99999 PR PBB SHADOW E&M-EST. PATIENT-LVL IV: CPT | Mod: PBBFAC,,, | Performed by: PHYSICIAN ASSISTANT

## 2024-04-16 PROCEDURE — 1160F RVW MEDS BY RX/DR IN RCRD: CPT | Mod: CPTII,S$GLB,, | Performed by: PHYSICIAN ASSISTANT

## 2024-04-16 PROCEDURE — 99214 OFFICE O/P EST MOD 30 MIN: CPT | Mod: S$GLB,,, | Performed by: PHYSICIAN ASSISTANT

## 2024-04-16 RX ORDER — CLONAZEPAM 1 MG/1
1 TABLET ORAL 2 TIMES DAILY
COMMUNITY

## 2024-04-16 RX ORDER — DEXTROAMPHETAMINE SACCHARATE, AMPHETAMINE ASPARTATE, DEXTROAMPHETAMINE SULFATE AND AMPHETAMINE SULFATE 2.5; 2.5; 2.5; 2.5 MG/1; MG/1; MG/1; MG/1
1 TABLET ORAL EVERY MORNING
COMMUNITY

## 2024-04-16 RX ORDER — QUETIAPINE FUMARATE 25 MG/1
25-50 TABLET, FILM COATED ORAL NIGHTLY PRN
COMMUNITY

## 2024-04-16 NOTE — LETTER
April 16, 2024      [facility]  [address]  [place] [address]  Phone: [phone]  Fax: [phone]       Patient: Adrianna Velazquez   YOB: 1965  Date of Visit: 04/16/2024    To Whom It May Concern:    Adrianna Velazquez  was seen at [facility] on 04/16/2024. The patient may return to work/school on 04/17/2024 with no restrictions. If you have any questions or concerns, or if I can be of further assistance, please do not hesitate to contact me at the number provided.      Sincerely,    FREDDIE Adams

## 2024-04-16 NOTE — PROGRESS NOTES
[facility]  Breast Surgical Oncology  Follow-Up Patient Office Visit       Referring Provider: No ref. provider found   PCP: Rain Baxter, NP   Care Team:   Medical Oncologist: Dr. Cr Rios  Previous oncologist at [facility]: Dr. Jimenez     Chief Complaint:   Chief Complaint   Patient presents with    Follow-up     Patient c/o intermittent aching pain under right breast, no swelling, c/o breast implant may be deflating in right breast      Subjective:     Treatment History:  At age 41 felt a lump in her breast. Had MMG that was suspicious. Lumpectomy showed G3 IDC in right breast with micromet in 1/5 SL. Axillary contents were all negative 2/8/7. Margins were positive for DCIS. She underwent simple mastectomy 2/19/17, no residual IDC, margins free from DCIS. Tumor was triple negative. It appears she received ddAC x4 and then dd Taxol    2/13/19 she had resection of duodenum with cholecystectomy and omental flap closure that showed a leiomyoma with Dr Ca    11/2019 genetic testing negative for VUS or any deleterious mutations    Interval History:  4/16/2024 - Adrianna Velazquez is here today for annual follow up for history of breast cancer. She is doing great and has no new breast concerns. She has had screening mammogram on the left in March which was benign. She continues follow up with oncology in [place]. She does continue to have occasional pains in the right reconstructed breast/axilla which have occurred off and on since her initial surgery. This is usually improved with rest and aggravated with heavy lifting. Since her last visit, she states that her shoulder ROM and stiffness is much improved after she has been doing home exercises. She also has decided to wait on revising her reconstruction because she wants to try with a surgeon in [place] eventually.      HPI:  Adrianna Velazquez is a pleasant Female patient who initially presents on  22 at 56 Years old with a past medical history of right breast cancer diagnosed at age 41. She presents to establish care for breast cancer surveillance.  She is also following with plastic surgeon, Dr. Mast, with concerns about right implant integrity. She does have occasional pains in the right reconstructed breast/axilla which have occurred off and on since her initial breast surgery. It is associated with chronic right shoulder stiffness since time of surgery as well. She also has decided to wait on revising her reconstruction because she wants to try with another surgeon eventually.      She states that her treatments in  were performed in [place] in [place] but she is not sure which hospital or which physician treated her. I do not have records of her pathology, imaging, or office visit notes. All information noted under treatments and pathology are as seen in [facility] notes.    Imagin.  2021 L SCR MG at [facility] - BIRADS 1: NEGATIVE: No evidence of malignancy.     2.  2022 R DG MG and R breast US at [facility] to evaluate for right breast implant integrity - BIRADS 2: ULTRASOUND BENIGN 1) Postsurgical changes related to prior right mastectomy (including removal of the right nipple) and subpectoral silicone implant reconstruction. BI-RADS 2: Benign. 2) No mammographic or sonographic evidence of right breast implant rupture, noting implant integrity cannot be definitively assessed with mammography and sonography. For definitive implant assessment, a breast MRI utilizing the implant integrity protocol is necessary. This exam does not require intravenous gadolinium contrast.  3) No mammographic or sonographic evidence of malignancy in the right breast.    3.  9/15/2022 L SCR MG at [facility] - BIRADS 2: BENIGN: No new suspicious masses, microcalcifications, or other abnormalities are seen within the left breast.    4. 3/21/2024 SCR MG at [facility] - BIRADS 2: BENIGN: There is no mammographic  evidence of malignancy.     Pathology:   right breast mass lumpectomy: IDC G3, 1.5cm. Associated DCIS, solid type GIII. Skin free of tumor, margins are negative but closest is superficial at 5mm. DCIS involves inked surgical margin at multiple foci (inferior, deep, medial and lateral). 1/5 SLN positive for metastatic carcinoma, micromet 1.8mm, no extracapsular extension. 13 axillary nodes all negative  pT1c pN1mi  ER 0% NV 0% HER2 1+      Right breast simple mastectomy: multifocal DCIS comedo, GIII. No residual IDC. Deep margin is free of tumor, closest is deep at 0.3cm    OB/GYN History:  Menarche Onset: 13  Menopause: Post, at age: 46  Hormonal birth control (duration): 22years  Pregnancies: 3  Age at first child birth: 19  Child births: 3  Hysterectomy: yes,  46  Oophorectomy: no  HRT: no    Other:  MG breast density: BIRADS B, scattered fibroglandular densities  Prior thoracic RT: none  Genetic testing: yes, negative  Ashkenazi Sabianism descent: No    Family History:  Family History   Problem Relation Name Age of Onset    Breast cancer Mother  62    Breast cancer Maternal Aunt          dx in their 40s    Breast cancer Maternal Aunt          dx in their 40s    Breast cancer Maternal Aunt          dx in their 40s        Patient History:  Past Medical History:   Diagnosis Date    Depression        Past Surgical History:   Procedure Laterality Date    HYSTERECTOMY      LUMPECTOMY, BREAST Right 2007    MASTECTOMY, MODIFIED RADICAL, WITH SENTINEL LYMPH NODE BIOPSY Right 2007    Resection of duodenum with cholecystectomy and omental flap closure  2019    Leiomyoma; performed by Dr Ca       Social History     Socioeconomic History    Marital status:    Tobacco Use    Smoking status: Former     Current packs/day: 0.00     Types: Cigarettes     Quit date: 2019     Years since quittin.2    Smokeless tobacco: Never         There is no immunization history on file for this  patient.    Medications/Allergies:  Current Outpatient Medications on File Prior to Visit   Medication Sig Dispense Refill    clonazePAM (KLONOPIN) 1 MG tablet Take 1 mg by mouth 2 (two) times daily.      dextroamphetamine-amphetamine 10 mg Tab Take 1 tablet by mouth every morning.      ibuprofen (ADVIL,MOTRIN) 800 MG tablet Take 800 mg by mouth. prn      LINZESS 290 mcg Cap capsule Take 290 mcg by mouth 3 (three) times daily.      omeprazole (PRILOSEC) 40 MG capsule Take 40 mg by mouth once daily.      QUEtiapine (SEROQUEL) 25 MG Tab Take 25-50 mg by mouth nightly as needed.      loratadine (CLARITIN) 10 mg tablet prn (Patient not taking: Reported on 4/16/2024)      propranoloL (INDERAL) 10 MG tablet Take by mouth. (Patient not taking: Reported on 4/16/2024)      zolpidem (AMBIEN) 10 mg Tab Take 10 mg by mouth every evening. (Patient not taking: Reported on 4/16/2024)      [DISCONTINUED] VITAMIN D3 25 mcg (1,000 unit) capsule Take 1,000 Units by mouth once daily. (Patient not taking: Reported on 4/16/2024)       No current facility-administered medications on file prior to visit.       Review of patient's allergies indicates:   Allergen Reactions    Hydrocodone-acetaminophen      Other Reaction(s): UNKNOWN       Review of Systems:  Pertinent items are noted in HPI.     Objective:     Vitals:  Vitals:    04/16/24 1544   BP: 132/88   Pulse: 89   Resp: 20   Temp: 98.4 °F (36.9 °C)         Body mass index is 31.09 kg/m².     Physical Exam:  General: The patient is awake, alert and oriented times three. The patient is well nourished and in no acute distress.  Neck: There is no evidence of palpable cervical, supraclavicular or axillary adenopathy. The neck is supple. The thyroid is not enlarged.  Musculoskeletal: The patient has a normal range of motion of her bilateral upper extremities.  Chest: Examination of the chest wall fails to reveal any obvious abnormalities. Nonlabored breathing, symmetric  expansion.  Breast:  Right: The right reconstructed smaller compared to the left breast.  Examination fails to reveal any dominant masses or areas of significant focal nodularity. The nipple is surgically absent. There is no skin dimpling with movement of the pectoralis. There are no significant skin changes.   Left: Wise pattern from prior augmentation. Examination of the left breast fails to reveal any dominant masses or areas of significant focal nodularity. The nipple is everted without evidence of discharge. There is no skin dimpling with movement of the pectoralis. There are no significant skin changes overlying the breast.  Abdomen: The abdomen is soft, flat, nontender and nondistended.  Integumentary: no rashes or skin lesions present  Neurologic: cranial nerves intact, no signs of peripheral neurological deficit, motor/sensory function intact      Assessment and Plan:       Adrianna was seen today for follow-up.    Diagnoses and all orders for this visit:    Personal history of breast cancer  -     MRI Screening Breast W/WO Contrast, W/CAD, [place]; Future    Screening mammogram, encounter for  -     Mammo Digital Screening Bilat w/ [place]; Future         4/16/2024 - Adrianna Velazquez is here today for annual follow up for history of breast cancer. She is doing great and has no new breast concerns. She has had screening mammogram on the left in March which was benign. She continues follow up with oncology in [place]. She does continue to have occasional pains in the right reconstructed breast/axilla which have occurred off and on since her initial surgery. This is usually improved with rest and aggravated with heavy lifting. Since her last visit, she states that her shoulder ROM and stiffness is much improved after she has been doing home exercises. She also has decided to wait on revising her reconstruction because she wants to try with a surgeon in [place] eventually.       Plan:     1.  Recommend  surveillance with screening breast MRIs in this patient with a history of breast cancer diagnosed before 50 years of age. Due 9/2024.     2.  SCR MG due 3/2025.    3.  Okay to proceed with plastic surgery/implant exchange if desired.  She wants to go to see a plastic surgeon [place] but states she cannot afford it at this time. She will let us know if she needs a referral.    4. RTC in 6 months for follow up after MRI. After this visit, may transition to annual follow up.    5. I discussed ways to reduce breast pain/tenderness. I advised to wear a good, supportive bra both day and night when symptoms are worse. Avoid contact sports and other activities which could cause injury to the breasts. She may take Tylenol or NSAIDs when the pain is worse. Encouraged her to continue home exercises for stretching and strengthening for the right shoulder and to help with pain near the axilla.      All of her questions were answered. She was advised to call if she develops any questions or concerns.    Bessy De La Rosa PA-C          Total time on the date of the visit ranged from 30-39 mins ([id number]). Total time includes both face-to-face and non-face-to-face time personally spent by myself on the day of the visit.    Non-face-to-face time included:  _X_ preparing to see the patient such as reviewing the patient record  __ obtaining and reviewing separately obtained history  _X_ independently interpreting results  _X_ documenting clinical information in electronic health record.    Face-to-face time included:  _X_ performing an appropriate history and examination  _X_ communicating results to the patient  _X_ counseling and educating the patient  __ ordering appropriate medications  _x_ ordering appropriate tests  _X_ ordering appropriate procedures (including follow-up)  _X_ answering any questions the patient had    Total Time spent on date of visit: 35 minutes

## 2024-06-18 ENCOUNTER — HOSPITAL ENCOUNTER (EMERGENCY)
Facility: HOSPITAL | Age: 59
Discharge: HOME | End: 2024-06-18
Attending: STUDENT IN AN ORGANIZED HEALTH CARE EDUCATION/TRAINING PROGRAM
Payer: COMMERCIAL

## 2024-06-18 VITALS
OXYGEN SATURATION: 100 % | RESPIRATION RATE: 14 BRPM | TEMPERATURE: 98 F | SYSTOLIC BLOOD PRESSURE: 110 MMHG | DIASTOLIC BLOOD PRESSURE: 68 MMHG | HEIGHT: 65 IN | HEART RATE: 68 BPM | BODY MASS INDEX: 28.66 KG/M2 | WEIGHT: 172 LBS

## 2024-06-18 DIAGNOSIS — T21.22XA PARTIAL THICKNESS BURN OF ABDOMEN, INITIAL ENCOUNTER: Primary | ICD-10-CM

## 2024-06-18 PROCEDURE — 25000003 PHARM REV CODE 250: Performed by: NURSE PRACTITIONER

## 2024-06-18 PROCEDURE — 99284 EMERGENCY DEPT VISIT MOD MDM: CPT

## 2024-06-18 RX ORDER — SILVER SULFADIAZINE 10 G/1000G
1 CREAM TOPICAL
Status: COMPLETED | OUTPATIENT
Start: 2024-06-18 | End: 2024-06-18

## 2024-06-18 RX ORDER — OXYCODONE AND ACETAMINOPHEN 5; 325 MG/1; MG/1
1 TABLET ORAL
Status: COMPLETED | OUTPATIENT
Start: 2024-06-18 | End: 2024-06-18

## 2024-06-18 RX ORDER — OXYCODONE AND ACETAMINOPHEN 5; 325 MG/1; MG/1
1 TABLET ORAL EVERY 6 HOURS PRN
Qty: 12 TABLET | Refills: 0 | Status: SHIPPED | OUTPATIENT
Start: 2024-06-18

## 2024-06-18 RX ORDER — SILVER SULFADIAZINE 10 G/1000G
CREAM TOPICAL 2 TIMES DAILY
Qty: 85 G | Refills: 0 | Status: SHIPPED | OUTPATIENT
Start: 2024-06-18

## 2024-06-18 RX ADMIN — OXYCODONE HYDROCHLORIDE AND ACETAMINOPHEN 1 TABLET: 5; 325 TABLET ORAL at 05:06

## 2024-06-18 RX ADMIN — SILVER SULFADIAZINE 1 TUBE: 10 CREAM TOPICAL at 06:06
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
## 2024-06-18 NOTE — Clinical Note
"Adrianna Gargernesto Velazquez was seen and treated in our emergency department on 6/18/2024.  She may return to work on 06/21/2024.       If you have any questions or concerns, please don't hesitate to call.      Corwin Damon, MAGDALENOP"
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
## 2024-06-18 NOTE — Clinical Note
"Adrianna Crawford" Marcus was seen and treated in our emergency department on 6/18/2024.  She may return to work on 06/21/2024.       If you have any questions or concerns, please don't hesitate to call.      Nadir Mejia RN    "

## 2024-10-13 ENCOUNTER — OFFICE VISIT (OUTPATIENT)
Dept: URGENT CARE | Facility: CLINIC | Age: 59
End: 2024-10-13
Payer: COMMERCIAL

## 2024-10-13 ENCOUNTER — HOSPITAL ENCOUNTER (OUTPATIENT)
Dept: RADIOLOGY | Facility: HOSPITAL | Age: 59
Discharge: HOME | End: 2024-10-13
Payer: COMMERCIAL

## 2024-10-13 VITALS
RESPIRATION RATE: 18 BRPM | TEMPERATURE: 98 F | WEIGHT: 180 LBS | HEART RATE: 84 BPM | DIASTOLIC BLOOD PRESSURE: 82 MMHG | SYSTOLIC BLOOD PRESSURE: 135 MMHG | BODY MASS INDEX: 29.99 KG/M2 | HEIGHT: 65 IN | OXYGEN SATURATION: 99 %

## 2024-10-13 DIAGNOSIS — S16.1XXA ACUTE STRAIN OF NECK MUSCLE, INITIAL ENCOUNTER: Primary | ICD-10-CM
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
## 2024-10-13 DIAGNOSIS — M54.2 CERVICAL PAIN (NECK): ICD-10-CM

## 2024-10-13 PROCEDURE — 72040 X-RAY EXAM NECK SPINE 2-3 VW: CPT | Mod: TC

## 2024-10-13 PROCEDURE — 63600175 PHARM REV CODE 636 W HCPCS

## 2024-10-13 PROCEDURE — 99203 OFFICE O/P NEW LOW 30 MIN: CPT | Mod: S$PBB,,,

## 2024-10-13 PROCEDURE — 99215 OFFICE O/P EST HI 40 MIN: CPT | Mod: PBBFAC,25

## 2024-10-13 RX ORDER — BACLOFEN 10 MG/1
10 TABLET ORAL 3 TIMES DAILY PRN
Qty: 15 TABLET | Refills: 0 | Status: SHIPPED | OUTPATIENT
Start: 2024-10-13 | End: 2024-10-18

## 2024-10-13 RX ORDER — KETOROLAC TROMETHAMINE 30 MG/ML
30 INJECTION, SOLUTION INTRAMUSCULAR; INTRAVENOUS
Status: COMPLETED | OUTPATIENT
Start: 2024-10-13 | End: 2024-10-13

## 2024-10-13 RX ADMIN — KETOROLAC TROMETHAMINE 30 MG: 30 INJECTION, SOLUTION INTRAMUSCULAR at 02:10

## 2024-10-13 NOTE — NURSING
Toradol 30 mg administered to Right Gluteal using aseptic technique. Patient observed for 15 minutes for reactions, none noted. Patient tolerated well.

## 2024-10-13 NOTE — PROGRESS NOTES
"Subjective:       Patient ID: Adrianna Velazquez is a 58 y.o. female.    Vitals:  height is 5' 5" (1.651 m) and weight is 81.6 kg (180 lb). Her oral temperature is 98 °F (36.7 °C). Her blood pressure is 135/82 and her pulse is 84. Her respiration is 18 and oxygen saturation is 99%.     Chief Complaint: Neck Pain (Neck pain, base of head and right shoulder pain from a fall 9/28.)    59 y/o AAF presents to clinic with sisters, she is c/o occipital pain that radiates to right side of neck appx 2 weeks after fall from standing height, she reports fall was unwitnessed, denies anticoagulant use. States she has taken OTC medications with minimal improvement.         HENT:  Negative for facial swelling.    Neck: Positive for neck pain and neck stiffness.   Musculoskeletal:  Positive for muscle ache.   Neurological:  Negative for dizziness, light-headedness and disorientation.   Psychiatric/Behavioral:  Negative for disorientation.        Objective:      Physical Exam   Constitutional: She is oriented to person, place, and time. She is cooperative. She is easily aroused.  Non-toxic appearance. She does not appear ill. awake  HENT:   Head: Normocephalic and atraumatic.   Nose: Nose normal.   Eyes: Conjunctivae and lids are normal. Pupils are equal, round, and reactive to light. Extraocular movement intact   Neck: Neck supple. There are no signs of injury.          Comments: Areas outline are +TTP, no increased temp, no erythema, no palpable masses, no active spasms, +full range of motion pain with movement present. No spinous process tenderness present. muscular tenderness present.   Cardiovascular: Normal rate.   Pulmonary/Chest: Effort normal.   Abdominal: Normal appearance.   Musculoskeletal:      Right lower leg: No edema.      Left lower leg: No edema.   Neurological: She is alert, oriented to person, place, and time and easily aroused. GCS eye subscore is 4. GCS verbal subscore is 5. GCS motor subscore is 6.   Skin: Skin " is warm, dry, intact and not diaphoretic. Capillary refill takes less than 2 seconds.   Psychiatric: Her behavior is normal.   Nursing note and vitals reviewed.        Assessment:       1. Fall, initial encounter    2. Acute strain of neck muscle, initial encounter          Plan:     Images reviewed, no acute findings, if radiologist disagrees, staff will update patient.  We will treat as cervical strain, encouraged patient to take Tylenol as directed, alternate between ice and heat, continue to exercise and stretch.  Follow up with PCP if symptoms does not improve with prescriptive treatment, may need more advanced imaging.     Fall, initial encounter    Acute strain of neck muscle, initial encounter  -     ketorolac injection 30 mg  -     X-Ray Cervical Spine AP And Lateral; Future; Expected date: 10/13/2024  -     baclofen (LIORESAL) 10 MG tablet; Take 1 tablet (10 mg total) by mouth 3 (three) times daily as needed (neck pain).  Dispense: 15 tablet; Refill: 0      Narrative & Impression  EXAMINATION:  XR CERVICAL SPINE AP LATERAL     CLINICAL HISTORY:  Cervicalgia     TECHNIQUE:  AP, lateral, and open mouth views of the cervical spine were performed.     COMPARISON:  None     FINDINGS:  Vertebral body heights are maintained without appreciable fracture. Normal alignment.     No significant degenerative changes are seen.     Prevertebral soft tissues are unremarkable.     Impression:     No appreciable acute osseous abnormality.        Electronically signed by:Ladi Elliott  Date:                                            10/13/2024  Time:                                           14:36        Exam Ended: 10/13/24 14:15 CDT          No results found for this or any previous visit.

## 2025-06-06 ENCOUNTER — TELEPHONE (OUTPATIENT)
Dept: SURGERY | Facility: CLINIC | Age: 60
End: 2025-06-06
Payer: COMMERCIAL

## 2025-06-06 NOTE — TELEPHONE ENCOUNTER
I called the above patient there was no answer left a voice mail in reference of the call. The patient canceled her MG on 03/24/2025 and needs to be rescheduled.

## 2025-06-18 ENCOUNTER — TELEPHONE (OUTPATIENT)
Dept: FAMILY MEDICINE | Facility: CLINIC | Age: 60
End: 2025-06-18
Payer: COMMERCIAL

## 2025-08-30 ENCOUNTER — HOSPITAL ENCOUNTER (EMERGENCY)
Facility: HOSPITAL | Age: 60
Discharge: HOME | End: 2025-08-30
Attending: EMERGENCY MEDICINE
Payer: COMMERCIAL

## 2025-08-30 VITALS
DIASTOLIC BLOOD PRESSURE: 88 MMHG | RESPIRATION RATE: 20 BRPM | HEART RATE: 78 BPM | HEIGHT: 65 IN | OXYGEN SATURATION: 100 % | WEIGHT: 165 LBS | SYSTOLIC BLOOD PRESSURE: 132 MMHG | BODY MASS INDEX: 27.49 KG/M2 | TEMPERATURE: 98 F

## 2025-08-30 DIAGNOSIS — B96.89 BACTERIAL SINUSITIS: Primary | ICD-10-CM

## 2025-08-30 DIAGNOSIS — J32.9 BACTERIAL SINUSITIS: Primary | ICD-10-CM

## 2025-08-30 LAB
ALBUMIN SERPL-MCNC: 3.9 G/DL (ref 3.5–5)
ALBUMIN/GLOB SERPL: 0.8 RATIO (ref 1.1–2)
ALP SERPL-CCNC: 84 UNIT/L (ref 40–150)
ALT SERPL-CCNC: 14 UNIT/L (ref 0–55)
ANION GAP SERPL CALC-SCNC: 8 MEQ/L
AST SERPL-CCNC: 17 UNIT/L (ref 11–45)
BACTERIA #/AREA URNS AUTO: ABNORMAL /HPF
BASOPHILS # BLD AUTO: 0.03 X10(3)/MCL
BASOPHILS NFR BLD AUTO: 0.3 %
BILIRUB SERPL-MCNC: 1.4 MG/DL
BILIRUB UR QL STRIP.AUTO: NEGATIVE
BUN SERPL-MCNC: 16.6 MG/DL (ref 9.8–20.1)
CALCIUM SERPL-MCNC: 10.3 MG/DL (ref 8.4–10.2)
CHLORIDE SERPL-SCNC: 104 MMOL/L (ref 98–107)
CLARITY UR: CLEAR
CO2 SERPL-SCNC: 30 MMOL/L (ref 22–29)
COLOR UR AUTO: ABNORMAL
CREAT SERPL-MCNC: 0.86 MG/DL (ref 0.55–1.02)
CREAT/UREA NIT SERPL: 19
EOSINOPHIL # BLD AUTO: 0.18 X10(3)/MCL (ref 0–0.9)
EOSINOPHIL NFR BLD AUTO: 2 %
ERYTHROCYTE [DISTWIDTH] IN BLOOD BY AUTOMATED COUNT: 13.3 % (ref 11.5–17)
FLUAV AG UPPER RESP QL IA.RAPID: NOT DETECTED
FLUBV AG UPPER RESP QL IA.RAPID: NOT DETECTED
GFR SERPLBLD CREATININE-BSD FMLA CKD-EPI: >60 ML/MIN/1.73/M2
GLOBULIN SER-MCNC: 4.8 GM/DL (ref 2.4–3.5)
GLUCOSE SERPL-MCNC: 94 MG/DL (ref 74–100)
GLUCOSE UR QL STRIP: NORMAL
HCT VFR BLD AUTO: 41.3 % (ref 37–47)
HGB BLD-MCNC: 13.5 G/DL (ref 12–16)
HGB UR QL STRIP: NEGATIVE
HOLD SPECIMEN: NORMAL
HOLD SPECIMEN: NORMAL
HYALINE CASTS #/AREA URNS LPF: ABNORMAL /LPF
IMM GRANULOCYTES # BLD AUTO: 0.02 X10(3)/MCL (ref 0–0.04)
IMM GRANULOCYTES NFR BLD AUTO: 0.2 %
KETONES UR QL STRIP: NEGATIVE
LEUKOCYTE ESTERASE UR QL STRIP: NEGATIVE
LYMPHOCYTES # BLD AUTO: 1.73 X10(3)/MCL (ref 0.6–4.6)
LYMPHOCYTES NFR BLD AUTO: 18.8 %
MCH RBC QN AUTO: 26.9 PG (ref 27–31)
MCHC RBC AUTO-ENTMCNC: 32.7 G/DL (ref 33–36)
MCV RBC AUTO: 82.4 FL (ref 80–94)
MONOCYTES # BLD AUTO: 0.49 X10(3)/MCL (ref 0.1–1.3)
MONOCYTES NFR BLD AUTO: 5.3 %
MUCOUS THREADS URNS QL MICRO: ABNORMAL /LPF
NEUTROPHILS # BLD AUTO: 6.75 X10(3)/MCL (ref 2.1–9.2)
NEUTROPHILS NFR BLD AUTO: 73.4 %
NITRITE UR QL STRIP: NEGATIVE
NRBC BLD AUTO-RTO: 0 %
PH UR STRIP: 6 [PH]
PLATELET # BLD AUTO: 266 X10(3)/MCL (ref 130–400)
PMV BLD AUTO: 8.5 FL (ref 7.4–10.4)
POTASSIUM SERPL-SCNC: 4 MMOL/L (ref 3.5–5.1)
PROT SERPL-MCNC: 8.7 GM/DL (ref 6.4–8.3)
PROT UR QL STRIP: NEGATIVE
RBC # BLD AUTO: 5.01 X10(6)/MCL (ref 4.2–5.4)
RBC #/AREA URNS AUTO: ABNORMAL /HPF
SARS-COV-2 RNA RESP QL NAA+PROBE: NOT DETECTED
SODIUM SERPL-SCNC: 142 MMOL/L (ref 136–145)
SP GR UR STRIP.AUTO: 1.01 (ref 1–1.03)
SQUAMOUS #/AREA URNS LPF: ABNORMAL /HPF
STREP A PCR (OHS): NOT DETECTED
UROBILINOGEN UR STRIP-ACNC: NORMAL
WBC # BLD AUTO: 9.2 X10(3)/MCL (ref 4.5–11.5)
WBC #/AREA URNS AUTO: ABNORMAL /HPF

## 2025-08-30 PROCEDURE — 81001 URINALYSIS AUTO W/SCOPE: CPT | Performed by: PHYSICIAN ASSISTANT

## 2025-08-30 PROCEDURE — 80053 COMPREHEN METABOLIC PANEL: CPT | Performed by: PHYSICIAN ASSISTANT

## 2025-08-30 PROCEDURE — 87651 STREP A DNA AMP PROBE: CPT | Performed by: PHYSICIAN ASSISTANT

## 2025-08-30 PROCEDURE — 99284 EMERGENCY DEPT VISIT MOD MDM: CPT

## 2025-08-30 PROCEDURE — 87636 SARSCOV2 & INF A&B AMP PRB: CPT | Performed by: PHYSICIAN ASSISTANT

## 2025-08-30 PROCEDURE — 85025 COMPLETE CBC W/AUTO DIFF WBC: CPT | Performed by: PHYSICIAN ASSISTANT

## 2025-08-30 RX ORDER — AMOXICILLIN 875 MG/1
875 TABLET, COATED ORAL 2 TIMES DAILY
Qty: 14 TABLET | Refills: 0 | Status: SHIPPED | OUTPATIENT
Start: 2025-08-30 | End: 2025-08-30

## 2025-08-30 RX ORDER — BENZONATATE 100 MG/1
100 CAPSULE ORAL 3 TIMES DAILY PRN
Qty: 15 CAPSULE | Refills: 0 | Status: SHIPPED | OUTPATIENT
Start: 2025-08-30 | End: 2025-09-05

## 2025-08-30 RX ORDER — AMOXICILLIN 875 MG/1
875 TABLET, COATED ORAL 2 TIMES DAILY
Qty: 14 TABLET | Refills: 0 | Status: SHIPPED | OUTPATIENT
Start: 2025-08-30 | End: 2025-09-05

## 2025-08-30 RX ORDER — BENZONATATE 100 MG/1
100 CAPSULE ORAL 3 TIMES DAILY PRN
Qty: 15 CAPSULE | Refills: 0 | Status: SHIPPED | OUTPATIENT
Start: 2025-08-30 | End: 2025-08-30

## 2025-09-05 ENCOUNTER — OFFICE VISIT (OUTPATIENT)
Dept: HEMATOLOGY/ONCOLOGY | Facility: CLINIC | Age: 60
End: 2025-09-05
Payer: COMMERCIAL

## 2025-09-05 ENCOUNTER — TELEPHONE (OUTPATIENT)
Dept: FAMILY MEDICINE | Facility: CLINIC | Age: 60
End: 2025-09-05
Payer: COMMERCIAL

## 2025-09-05 VITALS
HEIGHT: 65 IN | WEIGHT: 174.38 LBS | DIASTOLIC BLOOD PRESSURE: 86 MMHG | RESPIRATION RATE: 18 BRPM | TEMPERATURE: 98 F | SYSTOLIC BLOOD PRESSURE: 138 MMHG | OXYGEN SATURATION: 98 % | HEART RATE: 100 BPM | BODY MASS INDEX: 29.05 KG/M2

## 2025-09-05 DIAGNOSIS — Z17.1 MALIGNANT NEOPLASM OF OVERLAPPING SITES OF RIGHT BREAST IN FEMALE, ESTROGEN RECEPTOR NEGATIVE: Primary | ICD-10-CM

## 2025-09-05 DIAGNOSIS — C50.811 MALIGNANT NEOPLASM OF OVERLAPPING SITES OF RIGHT BREAST IN FEMALE, ESTROGEN RECEPTOR NEGATIVE: Primary | ICD-10-CM

## 2025-09-05 RX ORDER — DICLOFENAC SODIUM 75 MG/1
75 TABLET, DELAYED RELEASE ORAL 2 TIMES DAILY
COMMUNITY
Start: 2025-08-19 | End: 2026-08-19

## 2025-09-05 RX ORDER — MELOXICAM 7.5 MG/1
7.5 TABLET ORAL 2 TIMES DAILY
COMMUNITY

## 2025-09-05 RX ORDER — TIZANIDINE HYDROCHLORIDE 4 MG/1
CAPSULE, GELATIN COATED ORAL 2 TIMES DAILY
COMMUNITY

## 2025-09-05 RX ORDER — CLONAZEPAM 1 MG/1
1 TABLET ORAL 2 TIMES DAILY PRN
COMMUNITY